# Patient Record
Sex: FEMALE | Race: WHITE | Employment: FULL TIME | URBAN - METROPOLITAN AREA
[De-identification: names, ages, dates, MRNs, and addresses within clinical notes are randomized per-mention and may not be internally consistent; named-entity substitution may affect disease eponyms.]

---

## 2019-02-08 LAB
ABO + RH BLD: NORMAL
BLD GP AB SCN SERPL QL: NEGATIVE
HBV SURFACE AG SERPL QL IA: NEGATIVE
HIV 1+2 AB+HIV1 P24 AG SERPL QL IA: NEGATIVE
RUBELLA ANTIBODY IGG QUANTITATIVE: NORMAL IU/ML
TREPONEMA ANTIBODIES: NORMAL

## 2019-08-30 LAB — GROUP B STREP PCR: NEGATIVE

## 2019-09-05 PROBLEM — O13.3 GESTATIONAL HYPERTENSION, THIRD TRIMESTER: Status: ACTIVE | Noted: 2019-09-05

## 2019-09-05 PROBLEM — Z3A.37 37 WEEKS GESTATION OF PREGNANCY: Status: ACTIVE | Noted: 2019-09-05

## 2019-09-05 NOTE — H&P
2019    Kaity Seay  [id number]            OB Admit History & Physical      Chief Complaint: Primary  Section  Gestational Hypertension    History of Present Illness:  39 year old  at 37W 0D. Estimated gestational age by LMP and known embryo transfer date with EDC 2019.    Pregnancy complicated by advanced maternal age, donor embryo conception, hypothyroidism (taking levothyroxine), gestational diabetes-diet controlled and thrombocytopenia. Her platelets in the first trimester (2019) was 153,000. On 2019 was 117,000. Last platelet count on 9/3/2019 was 110,000. She had a normal/negative Nuchal Translucency, Innatal, MSAFP and 20 week ultrasound.    She developed elevated blood pressure within the last two weeks. BP on  was 140/80 and on 9/3 was 144/76. Prior blood pressures were 110s-120s/60s-70s. Labs were drawn and there is no evidence of pre-eclampsia.    Above situation discussed with patient at length. Given gestational age and development of gestational hypertension, deliver recommended.    Patient desires an elective primary  Section as her mode of deliver. It has been reviewed with her the risks, benefits, pros and cons to a  Section vs a vaginal delivery. Questions answered. She desires to proceed.    LMP 2019   Her Estimated Date of Delivery: 2019, making her 37W 0D.      There is no height or weight on file to calculate BMI.    See prenatal for labs. GBS negative, Rubella Immune, Rh positive    Estimated fetal weight= 3300 gm       She is a 39 year old   Her OB history:   OB History   No data available          No past medical history on file.     No past surgical history on file.      No current outpatient medications on file.       Allergies: Patient has no allergy information on record.      REVIEW OF SYSTEMS:  NEUROLOGIC:  Negative  EYES:  Negative  ENT:  Negative  GI:  Negative  BREAST:  Negative  :  Negative  GYN:   "Negative  CV:  Negative  PULMONARY:  Negative  MUSCULOSKELETAL:  Negative  PSYCH:  Negative        Social History     Socioeconomic History     Marital status: Single     Spouse name: Not on file     Number of children: Not on file     Years of education: Not on file     Highest education level: Not on file   Occupational History     Not on file   Social Needs     Financial resource strain: Not on file     Food insecurity:     Worry: Not on file     Inability: Not on file     Transportation needs:     Medical: Not on file     Non-medical: Not on file   Tobacco Use     Smoking status: Not on file   Substance and Sexual Activity     Alcohol use: Not on file     Drug use: Not on file     Sexual activity: Not on file   Lifestyle     Physical activity:     Days per week: Not on file     Minutes per session: Not on file     Stress: Not on file   Relationships     Social connections:     Talks on phone: Not on file     Gets together: Not on file     Attends Islam service: Not on file     Active member of club or organization: Not on file     Attends meetings of clubs or organizations: Not on file     Relationship status: Not on file     Intimate partner violence:     Fear of current or ex partner: Not on file     Emotionally abused: Not on file     Physically abused: Not on file     Forced sexual activity: Not on file   Other Topics Concern     Not on file   Social History Narrative     Not on file      No family history on file.          Vitals: Height 5' 4.5\"  Weight 177 lbs  /76    General: Alert Awake in NAD  HEENT: grossly normal  Neck: no lymphadenopathy or thryoidomegaly  Lungs: clear  Back: no spinal or CVAT  Heart: S1 S2 RRR  Abdomen: gravid, soft  EXT:  Bilateral lower extremity edema but no calf tenderness  Neuro: CN 2-12 grossly intact    Assessment: 39 year old  at 37W 0D  Gestational Hypertension  Request for primary  Section  Gestational Thrombocytopenia (Platelets 153,000 on , " 117,000 on  and 110,000 on 9/3)  GBS negative    Plan: Primary  Section on 2019  Type and Screen and CBC on arrival    Issac Farr MD  Department of OB/GYN  2019

## 2019-09-06 ENCOUNTER — ANESTHESIA (OUTPATIENT)
Dept: OBGYN | Facility: CLINIC | Age: 39
End: 2019-09-06
Payer: COMMERCIAL

## 2019-09-06 ENCOUNTER — ANESTHESIA EVENT (OUTPATIENT)
Dept: OBGYN | Facility: CLINIC | Age: 39
End: 2019-09-06
Payer: COMMERCIAL

## 2019-09-06 ENCOUNTER — HOSPITAL ENCOUNTER (INPATIENT)
Facility: CLINIC | Age: 39
LOS: 4 days | Discharge: HOME | End: 2019-09-10
Attending: OBSTETRICS & GYNECOLOGY | Admitting: OBSTETRICS & GYNECOLOGY
Payer: COMMERCIAL

## 2019-09-06 DIAGNOSIS — Z98.891 STATUS POST PRIMARY LOW TRANSVERSE CESAREAN SECTION: ICD-10-CM

## 2019-09-06 DIAGNOSIS — O13.3 GESTATIONAL HYPERTENSION, THIRD TRIMESTER: Primary | ICD-10-CM

## 2019-09-06 LAB
ABO + RH BLD: NORMAL
ABO + RH BLD: NORMAL
BASOPHILS # BLD AUTO: 0 10E9/L (ref 0–0.2)
BASOPHILS NFR BLD AUTO: 0.2 %
BLD GP AB SCN SERPL QL: NORMAL
BLOOD BANK CMNT PATIENT-IMP: NORMAL
DIFFERENTIAL METHOD BLD: ABNORMAL
EOSINOPHIL # BLD AUTO: 0.1 10E9/L (ref 0–0.7)
EOSINOPHIL NFR BLD AUTO: 1.2 %
ERYTHROCYTE [DISTWIDTH] IN BLOOD BY AUTOMATED COUNT: 13.4 % (ref 10–15)
GLUCOSE BLDC GLUCOMTR-MCNC: 73 MG/DL (ref 70–99)
GLUCOSE SERPL-MCNC: 80 MG/DL (ref 70–99)
HCT VFR BLD AUTO: 39.6 % (ref 35–47)
HGB BLD-MCNC: 13.6 G/DL (ref 11.7–15.7)
IMM GRANULOCYTES # BLD: 0.1 10E9/L (ref 0–0.4)
IMM GRANULOCYTES NFR BLD: 0.8 %
LYMPHOCYTES # BLD AUTO: 1 10E9/L (ref 0.8–5.3)
LYMPHOCYTES NFR BLD AUTO: 10.9 %
MCH RBC QN AUTO: 28.9 PG (ref 26.5–33)
MCHC RBC AUTO-ENTMCNC: 34.3 G/DL (ref 31.5–36.5)
MCV RBC AUTO: 84 FL (ref 78–100)
MONOCYTES # BLD AUTO: 0.5 10E9/L (ref 0–1.3)
MONOCYTES NFR BLD AUTO: 5.8 %
NEUTROPHILS # BLD AUTO: 7.5 10E9/L (ref 1.6–8.3)
NEUTROPHILS NFR BLD AUTO: 81.1 %
NRBC # BLD AUTO: 0 10*3/UL
NRBC BLD AUTO-RTO: 0 /100
PLATELET # BLD AUTO: 96 10E9/L (ref 150–450)
RBC # BLD AUTO: 4.71 10E12/L (ref 3.8–5.2)
SPECIMEN EXP DATE BLD: NORMAL
T PALLIDUM AB SER QL: NONREACTIVE
WBC # BLD AUTO: 9.3 10E9/L (ref 4–11)

## 2019-09-06 PROCEDURE — 25000125 ZZHC RX 250: Performed by: OBSTETRICS & GYNECOLOGY

## 2019-09-06 PROCEDURE — 86901 BLOOD TYPING SEROLOGIC RH(D): CPT | Performed by: OBSTETRICS & GYNECOLOGY

## 2019-09-06 PROCEDURE — 25000132 ZZH RX MED GY IP 250 OP 250 PS 637: Performed by: OBSTETRICS & GYNECOLOGY

## 2019-09-06 PROCEDURE — 37000008 ZZH ANESTHESIA TECHNICAL FEE, 1ST 30 MIN: Performed by: OBSTETRICS & GYNECOLOGY

## 2019-09-06 PROCEDURE — 25800030 ZZH RX IP 258 OP 636: Performed by: OBSTETRICS & GYNECOLOGY

## 2019-09-06 PROCEDURE — 37000009 ZZH ANESTHESIA TECHNICAL FEE, EACH ADDTL 15 MIN: Performed by: OBSTETRICS & GYNECOLOGY

## 2019-09-06 PROCEDURE — 25000128 H RX IP 250 OP 636: Performed by: OBSTETRICS & GYNECOLOGY

## 2019-09-06 PROCEDURE — 27210794 ZZH OR GENERAL SUPPLY STERILE: Performed by: OBSTETRICS & GYNECOLOGY

## 2019-09-06 PROCEDURE — 86850 RBC ANTIBODY SCREEN: CPT | Performed by: OBSTETRICS & GYNECOLOGY

## 2019-09-06 PROCEDURE — 82947 ASSAY GLUCOSE BLOOD QUANT: CPT | Performed by: OBSTETRICS & GYNECOLOGY

## 2019-09-06 PROCEDURE — 36415 COLL VENOUS BLD VENIPUNCTURE: CPT | Performed by: OBSTETRICS & GYNECOLOGY

## 2019-09-06 PROCEDURE — 00000146 ZZHCL STATISTIC GLUCOSE BY METER IP

## 2019-09-06 PROCEDURE — 12000035 ZZH R&B POSTPARTUM

## 2019-09-06 PROCEDURE — 25000128 H RX IP 250 OP 636: Performed by: ANESTHESIOLOGY

## 2019-09-06 PROCEDURE — 25800030 ZZH RX IP 258 OP 636: Performed by: NURSE ANESTHETIST, CERTIFIED REGISTERED

## 2019-09-06 PROCEDURE — 71000012 ZZH RECOVERY PHASE 1 LEVEL 1 FIRST HR: Performed by: OBSTETRICS & GYNECOLOGY

## 2019-09-06 PROCEDURE — 86900 BLOOD TYPING SEROLOGIC ABO: CPT | Performed by: OBSTETRICS & GYNECOLOGY

## 2019-09-06 PROCEDURE — 86780 TREPONEMA PALLIDUM: CPT | Performed by: OBSTETRICS & GYNECOLOGY

## 2019-09-06 PROCEDURE — 25000132 ZZH RX MED GY IP 250 OP 250 PS 637

## 2019-09-06 PROCEDURE — 36000058 ZZH SURGERY LEVEL 3 EA 15 ADDTL MIN: Performed by: OBSTETRICS & GYNECOLOGY

## 2019-09-06 PROCEDURE — 71000013 ZZH RECOVERY PHASE 1 LEVEL 1 EA ADDTL HR: Performed by: OBSTETRICS & GYNECOLOGY

## 2019-09-06 PROCEDURE — 85025 COMPLETE CBC W/AUTO DIFF WBC: CPT | Performed by: OBSTETRICS & GYNECOLOGY

## 2019-09-06 PROCEDURE — 36000056 ZZH SURGERY LEVEL 3 1ST 30 MIN: Performed by: OBSTETRICS & GYNECOLOGY

## 2019-09-06 PROCEDURE — 25000125 ZZHC RX 250: Performed by: NURSE ANESTHETIST, CERTIFIED REGISTERED

## 2019-09-06 PROCEDURE — 25000128 H RX IP 250 OP 636: Performed by: NURSE ANESTHETIST, CERTIFIED REGISTERED

## 2019-09-06 RX ORDER — CITRIC ACID/SODIUM CITRATE 334-500MG
SOLUTION, ORAL ORAL
Status: COMPLETED
Start: 2019-09-06 | End: 2019-09-06

## 2019-09-06 RX ORDER — NALBUPHINE HYDROCHLORIDE 10 MG/ML
2.5-5 INJECTION, SOLUTION INTRAMUSCULAR; INTRAVENOUS; SUBCUTANEOUS EVERY 6 HOURS PRN
Status: DISCONTINUED | OUTPATIENT
Start: 2019-09-06 | End: 2019-09-07

## 2019-09-06 RX ORDER — PRENATAL VIT/IRON FUM/FOLIC AC 27MG-0.8MG
1 TABLET ORAL DAILY
COMMUNITY

## 2019-09-06 RX ORDER — LIDOCAINE 40 MG/G
CREAM TOPICAL
Status: DISCONTINUED | OUTPATIENT
Start: 2019-09-06 | End: 2019-09-07

## 2019-09-06 RX ORDER — NALOXONE HYDROCHLORIDE 0.4 MG/ML
.1-.4 INJECTION, SOLUTION INTRAMUSCULAR; INTRAVENOUS; SUBCUTANEOUS
Status: DISCONTINUED | OUTPATIENT
Start: 2019-09-06 | End: 2019-09-10 | Stop reason: HOSPADM

## 2019-09-06 RX ORDER — OXYTOCIN/0.9 % SODIUM CHLORIDE 30/500 ML
340 PLASTIC BAG, INJECTION (ML) INTRAVENOUS CONTINUOUS PRN
Status: DISCONTINUED | OUTPATIENT
Start: 2019-09-06 | End: 2019-09-10 | Stop reason: HOSPADM

## 2019-09-06 RX ORDER — CEFAZOLIN SODIUM 1 G/3ML
1 INJECTION, POWDER, FOR SOLUTION INTRAMUSCULAR; INTRAVENOUS SEE ADMIN INSTRUCTIONS
Status: DISCONTINUED | OUTPATIENT
Start: 2019-09-06 | End: 2019-09-06 | Stop reason: HOSPADM

## 2019-09-06 RX ORDER — HYDROCORTISONE 2.5 %
CREAM (GRAM) TOPICAL 3 TIMES DAILY PRN
Status: DISCONTINUED | OUTPATIENT
Start: 2019-09-06 | End: 2019-09-10 | Stop reason: HOSPADM

## 2019-09-06 RX ORDER — AMOXICILLIN 250 MG
1 CAPSULE ORAL 2 TIMES DAILY PRN
Status: DISCONTINUED | OUTPATIENT
Start: 2019-09-06 | End: 2019-09-10 | Stop reason: HOSPADM

## 2019-09-06 RX ORDER — BISACODYL 10 MG
10 SUPPOSITORY, RECTAL RECTAL DAILY PRN
Status: DISCONTINUED | OUTPATIENT
Start: 2019-09-08 | End: 2019-09-10 | Stop reason: HOSPADM

## 2019-09-06 RX ORDER — SODIUM CHLORIDE, SODIUM LACTATE, POTASSIUM CHLORIDE, CALCIUM CHLORIDE 600; 310; 30; 20 MG/100ML; MG/100ML; MG/100ML; MG/100ML
INJECTION, SOLUTION INTRAVENOUS CONTINUOUS
Status: DISCONTINUED | OUTPATIENT
Start: 2019-09-06 | End: 2019-09-06 | Stop reason: HOSPADM

## 2019-09-06 RX ORDER — CITRIC ACID/SODIUM CITRATE 334-500MG
30 SOLUTION, ORAL ORAL
Status: DISCONTINUED | OUTPATIENT
Start: 2019-09-06 | End: 2019-09-06 | Stop reason: HOSPADM

## 2019-09-06 RX ORDER — LIDOCAINE 40 MG/G
CREAM TOPICAL
Status: DISCONTINUED | OUTPATIENT
Start: 2019-09-06 | End: 2019-09-06 | Stop reason: HOSPADM

## 2019-09-06 RX ORDER — HYDROMORPHONE HYDROCHLORIDE 1 MG/ML
.3-.5 INJECTION, SOLUTION INTRAMUSCULAR; INTRAVENOUS; SUBCUTANEOUS EVERY 30 MIN PRN
Status: DISCONTINUED | OUTPATIENT
Start: 2019-09-06 | End: 2019-09-10 | Stop reason: HOSPADM

## 2019-09-06 RX ORDER — EPHEDRINE SULFATE 50 MG/ML
5 INJECTION, SOLUTION INTRAMUSCULAR; INTRAVENOUS; SUBCUTANEOUS
Status: DISCONTINUED | OUTPATIENT
Start: 2019-09-06 | End: 2019-09-07

## 2019-09-06 RX ORDER — ONDANSETRON 2 MG/ML
4 INJECTION INTRAMUSCULAR; INTRAVENOUS EVERY 6 HOURS PRN
Status: DISCONTINUED | OUTPATIENT
Start: 2019-09-06 | End: 2019-09-10 | Stop reason: HOSPADM

## 2019-09-06 RX ORDER — MORPHINE SULFATE 1 MG/ML
INJECTION, SOLUTION EPIDURAL; INTRATHECAL; INTRAVENOUS
Status: DISCONTINUED
Start: 2019-09-06 | End: 2019-09-06 | Stop reason: HOSPADM

## 2019-09-06 RX ORDER — LANOLIN 100 %
OINTMENT (GRAM) TOPICAL
Status: DISCONTINUED | OUTPATIENT
Start: 2019-09-06 | End: 2019-09-10 | Stop reason: HOSPADM

## 2019-09-06 RX ORDER — AMOXICILLIN 250 MG
2 CAPSULE ORAL 2 TIMES DAILY PRN
Status: DISCONTINUED | OUTPATIENT
Start: 2019-09-06 | End: 2019-09-10 | Stop reason: HOSPADM

## 2019-09-06 RX ORDER — ACETAMINOPHEN 325 MG/1
975 TABLET ORAL EVERY 8 HOURS
Status: COMPLETED | OUTPATIENT
Start: 2019-09-06 | End: 2019-09-09

## 2019-09-06 RX ORDER — OXYTOCIN/0.9 % SODIUM CHLORIDE 30/500 ML
100 PLASTIC BAG, INJECTION (ML) INTRAVENOUS CONTINUOUS
Status: DISCONTINUED | OUTPATIENT
Start: 2019-09-06 | End: 2019-09-10 | Stop reason: HOSPADM

## 2019-09-06 RX ORDER — NALOXONE HYDROCHLORIDE 0.4 MG/ML
.1-.4 INJECTION, SOLUTION INTRAMUSCULAR; INTRAVENOUS; SUBCUTANEOUS
Status: DISCONTINUED | OUTPATIENT
Start: 2019-09-06 | End: 2019-09-07

## 2019-09-06 RX ORDER — IBUPROFEN 600 MG/1
600 TABLET, FILM COATED ORAL EVERY 6 HOURS PRN
Status: DISCONTINUED | OUTPATIENT
Start: 2019-09-07 | End: 2019-09-10 | Stop reason: HOSPADM

## 2019-09-06 RX ORDER — OXYTOCIN/0.9 % SODIUM CHLORIDE 30/500 ML
PLASTIC BAG, INJECTION (ML) INTRAVENOUS
Status: DISCONTINUED
Start: 2019-09-06 | End: 2019-09-06 | Stop reason: HOSPADM

## 2019-09-06 RX ORDER — LEVOTHYROXINE SODIUM 75 UG/1
75 TABLET ORAL DAILY
COMMUNITY

## 2019-09-06 RX ORDER — DEXTROSE, SODIUM CHLORIDE, SODIUM LACTATE, POTASSIUM CHLORIDE, AND CALCIUM CHLORIDE 5; .6; .31; .03; .02 G/100ML; G/100ML; G/100ML; G/100ML; G/100ML
INJECTION, SOLUTION INTRAVENOUS CONTINUOUS
Status: DISCONTINUED | OUTPATIENT
Start: 2019-09-06 | End: 2019-09-10 | Stop reason: HOSPADM

## 2019-09-06 RX ORDER — MORPHINE SULFATE 1 MG/ML
100 INJECTION, SOLUTION EPIDURAL; INTRATHECAL; INTRAVENOUS ONCE
Status: COMPLETED | OUTPATIENT
Start: 2019-09-06 | End: 2019-09-06

## 2019-09-06 RX ORDER — METOCLOPRAMIDE HYDROCHLORIDE 5 MG/ML
10 INJECTION INTRAMUSCULAR; INTRAVENOUS EVERY 6 HOURS PRN
Status: DISCONTINUED | OUTPATIENT
Start: 2019-09-06 | End: 2019-09-10 | Stop reason: HOSPADM

## 2019-09-06 RX ORDER — ONDANSETRON 2 MG/ML
INJECTION INTRAMUSCULAR; INTRAVENOUS PRN
Status: DISCONTINUED | OUTPATIENT
Start: 2019-09-06 | End: 2019-09-06

## 2019-09-06 RX ORDER — SIMETHICONE 80 MG
80 TABLET,CHEWABLE ORAL 4 TIMES DAILY PRN
Status: DISCONTINUED | OUTPATIENT
Start: 2019-09-06 | End: 2019-09-10 | Stop reason: HOSPADM

## 2019-09-06 RX ORDER — ONDANSETRON 2 MG/ML
INJECTION INTRAMUSCULAR; INTRAVENOUS
Status: COMPLETED
Start: 2019-09-06 | End: 2019-09-06

## 2019-09-06 RX ORDER — ACETAMINOPHEN 325 MG/1
TABLET ORAL
Status: COMPLETED
Start: 2019-09-06 | End: 2019-09-06

## 2019-09-06 RX ORDER — LIDOCAINE 40 MG/G
CREAM TOPICAL
Status: DISCONTINUED | OUTPATIENT
Start: 2019-09-06 | End: 2019-09-10 | Stop reason: HOSPADM

## 2019-09-06 RX ORDER — CEFAZOLIN SODIUM 2 G/100ML
INJECTION, SOLUTION INTRAVENOUS
Status: DISCONTINUED
Start: 2019-09-06 | End: 2019-09-06 | Stop reason: HOSPADM

## 2019-09-06 RX ORDER — OXYTOCIN/0.9 % SODIUM CHLORIDE 30/500 ML
PLASTIC BAG, INJECTION (ML) INTRAVENOUS CONTINUOUS PRN
Status: DISCONTINUED | OUTPATIENT
Start: 2019-09-06 | End: 2019-09-06

## 2019-09-06 RX ORDER — ACETAMINOPHEN 325 MG/1
650 TABLET ORAL EVERY 4 HOURS PRN
Status: DISCONTINUED | OUTPATIENT
Start: 2019-09-09 | End: 2019-09-10 | Stop reason: HOSPADM

## 2019-09-06 RX ORDER — BUPIVACAINE HYDROCHLORIDE 5 MG/ML
INJECTION, SOLUTION EPIDURAL; INTRACAUDAL
Status: COMPLETED | OUTPATIENT
Start: 2019-09-06 | End: 2019-09-06

## 2019-09-06 RX ORDER — KETOROLAC TROMETHAMINE 30 MG/ML
30 INJECTION, SOLUTION INTRAMUSCULAR; INTRAVENOUS EVERY 6 HOURS
Status: COMPLETED | OUTPATIENT
Start: 2019-09-06 | End: 2019-09-07

## 2019-09-06 RX ORDER — OXYCODONE HYDROCHLORIDE 5 MG/1
5-10 TABLET ORAL
Status: DISCONTINUED | OUTPATIENT
Start: 2019-09-06 | End: 2019-09-10 | Stop reason: HOSPADM

## 2019-09-06 RX ORDER — BUPIVACAINE HYDROCHLORIDE 7.5 MG/ML
INJECTION, SOLUTION INTRASPINAL PRN
Status: DISCONTINUED | OUTPATIENT
Start: 2019-09-06 | End: 2019-09-06

## 2019-09-06 RX ORDER — CEFAZOLIN SODIUM 2 G/100ML
2 INJECTION, SOLUTION INTRAVENOUS
Status: COMPLETED | OUTPATIENT
Start: 2019-09-06 | End: 2019-09-06

## 2019-09-06 RX ORDER — OXYTOCIN 10 [USP'U]/ML
10 INJECTION, SOLUTION INTRAMUSCULAR; INTRAVENOUS
Status: DISCONTINUED | OUTPATIENT
Start: 2019-09-06 | End: 2019-09-10 | Stop reason: HOSPADM

## 2019-09-06 RX ORDER — KETOROLAC TROMETHAMINE 30 MG/ML
INJECTION, SOLUTION INTRAMUSCULAR; INTRAVENOUS
Status: DISCONTINUED
Start: 2019-09-06 | End: 2019-09-06 | Stop reason: HOSPADM

## 2019-09-06 RX ADMIN — BUPIVACAINE HYDROCHLORIDE IN DEXTROSE 12 MG: 7.5 INJECTION, SOLUTION SUBARACHNOID at 08:59

## 2019-09-06 RX ADMIN — ACETAMINOPHEN 975 MG: 325 TABLET ORAL at 11:41

## 2019-09-06 RX ADMIN — SENNOSIDES AND DOCUSATE SODIUM 1 TABLET: 8.6; 5 TABLET ORAL at 19:27

## 2019-09-06 RX ADMIN — PHENYLEPHRINE HYDROCHLORIDE 0.5 MCG/KG/MIN: 10 INJECTION INTRAVENOUS at 09:02

## 2019-09-06 RX ADMIN — ONDANSETRON 4 MG: 2 INJECTION INTRAMUSCULAR; INTRAVENOUS at 09:06

## 2019-09-06 RX ADMIN — MORPHINE SULFATE 0.1 MG: 1 INJECTION, SOLUTION EPIDURAL; INTRATHECAL; INTRAVENOUS at 08:59

## 2019-09-06 RX ADMIN — Medication 100 ML/HR: at 13:17

## 2019-09-06 RX ADMIN — KETOROLAC TROMETHAMINE 30 MG: 30 INJECTION, SOLUTION INTRAMUSCULAR at 11:03

## 2019-09-06 RX ADMIN — SODIUM CHLORIDE, POTASSIUM CHLORIDE, SODIUM LACTATE AND CALCIUM CHLORIDE: 600; 310; 30; 20 INJECTION, SOLUTION INTRAVENOUS at 09:35

## 2019-09-06 RX ADMIN — ACETAMINOPHEN 975 MG: 325 TABLET, FILM COATED ORAL at 11:41

## 2019-09-06 RX ADMIN — ACETAMINOPHEN 975 MG: 325 TABLET ORAL at 19:27

## 2019-09-06 RX ADMIN — SODIUM CITRATE AND CITRIC ACID MONOHYDRATE 30 ML: 500; 334 SOLUTION ORAL at 08:41

## 2019-09-06 RX ADMIN — SODIUM CHLORIDE, POTASSIUM CHLORIDE, SODIUM LACTATE AND CALCIUM CHLORIDE: 600; 310; 30; 20 INJECTION, SOLUTION INTRAVENOUS at 07:54

## 2019-09-06 RX ADMIN — BUPIVACAINE HYDROCHLORIDE 1 ML: 5 INJECTION, SOLUTION EPIDURAL; INTRACAUDAL at 09:03

## 2019-09-06 RX ADMIN — CEFAZOLIN SODIUM 2 G: 2 INJECTION, SOLUTION INTRAVENOUS at 08:54

## 2019-09-06 RX ADMIN — SODIUM CHLORIDE, POTASSIUM CHLORIDE, SODIUM LACTATE AND CALCIUM CHLORIDE 1000 ML: 600; 310; 30; 20 INJECTION, SOLUTION INTRAVENOUS at 07:53

## 2019-09-06 RX ADMIN — KETOROLAC TROMETHAMINE 30 MG: 30 INJECTION, SOLUTION INTRAMUSCULAR at 18:00

## 2019-09-06 RX ADMIN — Medication 340 ML/HR: at 09:23

## 2019-09-06 RX ADMIN — SODIUM CHLORIDE, SODIUM LACTATE, POTASSIUM CHLORIDE, CALCIUM CHLORIDE AND DEXTROSE MONOHYDRATE: 5; 600; 310; 30; 20 INJECTION, SOLUTION INTRAVENOUS at 18:00

## 2019-09-06 ASSESSMENT — LIFESTYLE VARIABLES: TOBACCO_USE: 0

## 2019-09-06 ASSESSMENT — MIFFLIN-ST. JEOR: SCORE: 1443.79

## 2019-09-06 NOTE — PROCEDURES
OPERATIVE REPORT     DATE OF SURGERY: 2019     PREOPERATIVE DIAGNOSIS: 27 year old  at 37W 0D  Gestational Hypertension  Desires Primary  Section     POSTOPERATIVE DIAGNOSIS:39 year old  at 37W 0D  Gestational Hypertension   Desires Primary  Section  Delivered     PROCEDURE:  Primary low transverse  section via Pfannenstiel skin incision.      SURGEONS:  Issac Farr MD     ANESTHESIA:  Spinal     QBL: 145 mL     OBSERVATIONS:     Single viable male infant in cephalic presentation.   Clear amniotic fluid.  Apgars 6, 9 & 10  Weight 6 lbs 9 oz  Normal appearing uterus, tubes and ovaries.      COMPLICATIONS:  None.      DISPOSITION:  Patient stable to recovery room.      INDICATIONS: 39 year old  at 37W 0D. Estimated gestational age by LMP and known embryo transfer date with EDC 2019.     Pregnancy complicated by advanced maternal age, donor embryo conception, hypothyroidism (taking levothyroxine), gestational diabetes-diet controlled and thrombocytopenia. Her platelets in the first trimester (2019) was 153,000. On 2019 was 117,000. Last platelet count on 9/3/2019 was 110,000. She had a normal/negative Nuchal Translucency, Innatal, MSAFP and 20 week ultrasound.     She developed elevated blood pressure within the last two weeks. BP on  was 140/80 and on 9/3 was 144/76. Prior blood pressures were 110s-120s/60s-70s. Labs were drawn and there is no evidence of pre-eclampsia.     Above situation discussed with patient at length. Given gestational age and development of gestational hypertension, deliver recommended.     Patient desires an elective primary  Section as her mode of deliver. It has been reviewed with her the risks, benefits, pros and cons to a  Section vs a vaginal delivery.The nature of a  Section was discussed with her including the potential risks of surgery (bleeding, infection and injury to other organs).   Questions answered. She desires to proceed.          PROCEDURE:  The patient was taken to the operating room where she prepped and draped in normal sterile fashion in the dorsal supine position with leftward tilt. Using a scalpel, a Pfannenstiel skin incision was made.  This incision was carried down to the underlying layer of fascia.  The fascia was then nicked in the midline and this incision was extended laterally.  The superior aspect of the fascial incision was then grasped with two Kocher clamps, elevated, and rectus muscles dissected off bluntly as well as sharply with cautery.  In a similar fashion, the inferior aspect of the fascial incision was grasped with two Kocher clamps, elevated, and the rectus muscles dissected up bluntly as well as sharply with cautery. The rectus muscles were then  in the midline, and the peritoneum identified and entered bluntly.  This opening was then extended superiorly and inferiorly with excellent visualization of the bladder.  The bladder blade was then inserted, and the vesicouterine peritoneum identified, grasped with pickups and entered sharply with Metzenbaum scissors.  This incision was extended laterally, and a bladder flap created digitally.  The bladder blade was then repositioned.      Using a scalpel, a low transverse uterine incision was made.  This incision was extended laterally via finger fractionation.  The infant was delivered atraumatically. Nose and mouth were bulb suctioned. The cord was clamped and cut.  The infant was handed off to the waiting nurses.  The placenta was then removed manually, and the uterus exteriorized and cleared of all clots and debris.  The uterine incision was closed in two layers with 1-0 chromic gut. The bladder flap was closed with 3-0 chromic in a running fashion.  Excellent hemostasis was noted.  The uterus was then returned to the abdomen and the gutters irrigated and cleared of all clots and debris.  One final look at  the incision revealed excellent hemostasis.      The peritoneum was grasped with three Ana Cristina clamps, and closed with 2-0 Vicryl in a running fashion.  The subfascial layers and rectus muscles were then examined.  Excellent hemostasis was noted.  The fascia was tagged in the midline with a single interrupted stitch of 0 Vicryl, and then reapproximated with a running stitch from one apex to the midline, and then the other apex to the midline with 0 Vicryl.  The subq was visualized to have excellent hemostasis.  The subq was reapproximated with a running stitch of 3-0 plain gut.  The skin was closed with 4-0 Monocryl in subcuticular fashion.      The patient tolerated the procedure well.  Sponge, lap and needle counts were reported to me as correct x2.  The patient received 2 grams of Ancef after the start of the case for antibiotic prophylaxis.         GABRIEL BRADSHAW MD

## 2019-09-06 NOTE — ANESTHESIA PREPROCEDURE EVALUATION
Procedure: Procedure(s):  PRIMARY  SECTION  Preop diagnosis: ELECTIVE PRIMARY    No Known Allergies  Past Medical History:   Diagnosis Date     Diabetes (H)     GDDC     Hypertension     Gest. HTN at 36 weeks     Thrombocytopenia (H)     Gestational     Thyroid disease     hypothyroid     History reviewed. No pertinent surgical history.  Social History     Tobacco Use     Smoking status: Never Smoker     Smokeless tobacco: Never Used   Substance Use Topics     Alcohol use: Not Currently     Prior to Admission medications    Medication Sig Start Date End Date Taking? Authorizing Provider   levothyroxine (SYNTHROID/LEVOTHROID) 75 MCG tablet Take 75 mcg by mouth daily   Yes Reported, Patient   Prenatal Vit-Fe Fumarate-FA (PRENATAL MULTIVITAMIN W/IRON) 27-0.8 MG tablet Take 1 tablet by mouth daily   Yes Reported, Patient     Current Facility-Administered Medications Ordered in Epic   Medication Dose Route Frequency Last Rate Last Dose     ceFAZolin (ANCEF) 1 g vial to attach to  ml bag for ADULT or 50 ml bag for PEDS  1 g Intravenous See Admin Instructions         ceFAZolin (ANCEF) intermittent infusion 2 g in 100 mL dextrose PRE-MIX  2 g Intravenous Pre-Op/Pre-procedure x 1 dose         ceFAZolin-dextrose (ANCEF) 2-4 GM/100ML-% infusion             ePHEDrine injection 5 mg  5 mg Intravenous Q3 Min PRN         lactated ringers BOLUS 250 mL  250 mL Intravenous Once PRN         lactated ringers infusion   Intravenous Continuous 125 mL/hr at 19 0754       lidocaine (LMX4) cream   Topical Q1H PRN         lidocaine (LMX4) cream   Topical Q1H PRN         lidocaine 1 % 0.1-1 mL  0.1-1 mL Other Q1H PRN         lidocaine 1 % 1 mL  1 mL Other Q1H PRN         medication instruction   Does not apply Continuous PRN         morphine (PF) (ASTRAMORPH /DURAMORPH) injection 100 mcg  100 mcg Intrathecal Once         nalbuphine (NUBAIN) injection 2.5-5 mg  2.5-5 mg Intravenous Q6H PRN         naloxone (NARCAN)  injection 0.1-0.4 mg  0.1-0.4 mg Intravenous Q2 Min PRN         Opioid plan postpartum - medication instruction   Does not apply Continuous PRN         sodium chloride (PF) 0.9% PF flush 3 mL  3 mL Intravenous Q1H PRN         sodium chloride (PF) 0.9% PF flush 3 mL  3 mL Intravenous Q8H         sodium chloride (PF) 0.9% PF flush 3 mL  3 mL Intracatheter q1 min prn         sodium chloride (PF) 0.9% PF flush 3 mL  3 mL Intracatheter Q8H         sodium citrate-citric acid (BICITRA) 500-334 MG/5ML solution             sodium citrate-citric acid (BICITRA) solution 30 mL  30 mL Oral Pre-Op/Pre-procedure x 1 dose         No current [facility]-ordered outpatient medications on file.       lactated ringers 125 mL/hr at 19 0754     - MEDICATION INSTRUCTIONS -       - MEDICATION INSTRUCTIONS -       Wt Readings from Last 1 Encounters:   19 79.4 kg (175 lb)     Temp Readings from Last 1 Encounters:   19 36.3  C (97.4  F) (Temporal)     BP Readings from Last 6 Encounters:   19 134/87     Pulse Readings from Last 4 Encounters:   19 78     Resp Readings from Last 1 Encounters:   19 16     SpO2 Readings from Last 1 Encounters:   No data found for SpO2     Recent Labs   Lab Test 19  0724   GLC 80     No results for input(s): AST, ALT, ALKPHOS, BILITOTAL, LIPASE in the last 34603 hours.  Recent Labs   Lab Test 19  0724   WBC 9.3   HGB 13.6   PLT 96*     Recent Labs   Lab Test 19  0724   ABO O   RH Pos     No results for input(s): INR, PTT in the last 75629 hours.   No results for input(s): TROPI in the last 29130 hours.  No results for input(s): PH, PCO2, PO2, HCO3 in the last 85110 hours.  No results for input(s): HCG in the last 45952 hours.  No results found for this or any previous visit (from the past 744 hour(s)).    RECENT LABS:   ECG:   ECHO:   Anesthesia Pre-Procedure Evaluation    Patient: Kaity Seay   MRN: [id number] : 1980          Preoperative Diagnosis: ELECTIVE  "PRIMARY    Procedure(s):  PRIMARY  SECTION    Past Medical History:   Diagnosis Date     Diabetes (H)     GDDC     Hypertension     Gest. HTN at 36 weeks     Thrombocytopenia (H)     Gestational     Thyroid disease     hypothyroid     History reviewed. No pertinent surgical history.    Anesthesia Evaluation     . Pt has not had prior anesthetic            ROS/MED HX    ENT/Pulmonary:      (-) tobacco use   Neurologic:       Cardiovascular:     (+) hypertension----. : . . . :. .       METS/Exercise Tolerance:     Hematologic: Comments: Plts 96k       (-) anemia   Musculoskeletal:         GI/Hepatic:         Renal/Genitourinary:         Endo: Comment: Gest diabetic    (+) thyroid problem hypothyroidism, .      Psychiatric:         Infectious Disease:         Malignancy:         Other:                          Physical Exam  Normal systems: cardiovascular, pulmonary and dental    Airway   Mallampati: I  Neck ROM: full    Dental     Cardiovascular       Pulmonary             Lab Results   Component Value Date    WBC 9.3 2019    HGB 13.6 2019    HCT 39.6 2019    PLT 96 (L) 2019    GLC 80 2019       Preop Vitals  BP Readings from Last 3 Encounters:   19 134/87    Pulse Readings from Last 3 Encounters:   19 78      Resp Readings from Last 3 Encounters:   19 16    SpO2 Readings from Last 3 Encounters:   No data found for SpO2      Temp Readings from Last 1 Encounters:   19 36.3  C (97.4  F) (Temporal)    Ht Readings from Last 1 Encounters:   19 1.626 m (5' 4\")      Wt Readings from Last 1 Encounters:   19 79.4 kg (175 lb)    Estimated body mass index is 30.04 kg/m  as calculated from the following:    Height as of this encounter: 1.626 m (5' 4\").    Weight as of this encounter: 79.4 kg (175 lb).       Anesthesia Plan      History & Physical Review  History and physical reviewed and following examination; no interval change.    ASA Status:  2 .    NPO " Status:  > 8 hours    Plan for Spinal   PONV prophylaxis:  Ondansetron (or other 5HT-3)       Postoperative Care  Postoperative pain management:  IV analgesics.      Consents  Anesthetic plan, risks, benefits and alternatives discussed with:  Patient and Spouse..                 Amari Meade MD

## 2019-09-06 NOTE — ANESTHESIA CARE TRANSFER NOTE
Patient: Kaity Seay    Procedure(s):  PRIMARY  SECTION    Diagnosis: ELECTIVE PRIMARY  Diagnosis Additional Information: No value filed.    Anesthesia Type:   Spinal     Note:  Airway :Room Air  Patient transferred to:PACU  Comments: Transferred to OB PACU recovery, spontaneous respirations on room air with oxygen saturations maintained greater than 98%. SpO2, NiBP, and EKG monitors and alarms on and functioning, report on patient's clinical status given to OB recovery RN, RN questions answered, patient in hospital bed with siderails up, Jose Hugger warmer connected to patient gown, Oxygen tubing connected to wall O2 in OB PACU Oxytocin 30 units in 500mL infusion connected to IV infusion pump in recovery bay and programmed to 100 mL/hr at handoff of care.Handoff Report: Identifed the Patient, Identified the Reponsible Provider, Reviewed the pertinent medical history, Discussed the surgical course, Reviewed Intra-OP anesthesia mangement and issues during anesthesia, Set expectations for post-procedure period and Allowed opportunity for questions and acknowledgement of understanding      Vitals: (Last set prior to Anesthesia Care Transfer)    CRNA VITALS  2019 0935 - 2019 1006      2019             Resp Rate (set):  10                Electronically Signed By: SAHARA Gipson CRNA  2019  10:06 AM

## 2019-09-06 NOTE — PLAN OF CARE
At 1205 Pt. admitted from L&D via bed.. Pt. arrived with baby and was accompanied by &RN and arrived with personal belongings. Report was taken from Melanie rene RN in L&D.  Pt. is A&Ox3 and VSS on RA 96 to 98%. Fundus is firm and midline.  Vaginal bleeding is scant.   Pt. c/o 0/10 pain. Pt. denied any nausea, CP, SOB, lightheadedness, or dizziness. PIV patent and infusing.  Wan patent and draining.  Dressing to lower abdomen CDI.  Pneumoboots in place to BLE.  Pt. oriented to the room and call light system.Patient plan is to pump&bottle feed baby,started pumping every 3 hours,bottle feeding baby with donor milk for now.Will continue to monitor.

## 2019-09-06 NOTE — ANESTHESIA POSTPROCEDURE EVALUATION
Patient: Kaity Seay    Procedure(s):  PRIMARY  SECTION    Diagnosis:ELECTIVE PRIMARY  Diagnosis Additional Information: No value filed.    Anesthesia Type:  Spinal    Note:  Anesthesia Post Evaluation    Patient location during evaluation: PACU  Patient participation: Able to fully participate in evaluation  Level of consciousness: awake and alert  Pain management: adequate  Airway patency: patent  Cardiovascular status: acceptable  Respiratory status: acceptable  Hydration status: acceptable  PONV: none     Anesthetic complications: None          Last vitals:  Vitals:    19 1451 19 1636 19 1645   BP: (!) 148/81 138/82 (!) 151/82   Pulse:      Resp: 16 16 16   Temp: 36.8  C (98.2  F)  36.6  C (97.9  F)   SpO2: 98% 99% 98%         Electronically Signed By: Amari Meade MD  2019  4:50 PM

## 2019-09-06 NOTE — PLAN OF CARE
Data: Kaity Seay transferred to 404 via bed at 1200. Baby transferred via parent's arms.  Action: Receiving unit notified of transfer: Yes. Patient and family notified of room change. Report given on mom and baby to RASHAD Shah at 1205. Belongings sent to receiving unit. Accompanied by Registered Nurse. Oriented patient to surroundings. Call light within reach. ID bands double-checked with receiving RN.  Response: Patient tolerated transfer and is stable.

## 2019-09-06 NOTE — ANESTHESIA PROCEDURE NOTES
Peripheral nerve/Neuraxial procedure note : intrathecal  Pre-Procedure  Performed by Amari Meade MD  Location: OR, OB      Pre-Anesthestic Checklist: patient identified, IV checked, site marked, risks and benefits discussed, informed consent, monitors and equipment checked, pre-op evaluation, at physician/surgeon's request and post-op pain management    Timeout  Correct Patient: Yes   Correct Procedure: Yes   Correct Site: Yes   Correct Laterality: Yes   Correct Position: Yes   Site Marked: Yes   .   Procedure Documentation  ASA 2  .    Procedure:    Intrathecal.  Insertion Site:L2-3  (midline approach)      Patient Prep;mask, sterile gloves, povidone-iodine 7.5% surgical scrub, patient draped.  .  Needle: Quincke Spinal Needle (gauge): 25  Spinal/LP Needle Length (inches): 3 Introducer used .       Assessment/Narrative  Paresthesias: No.  .  .  clear CSF fluid removed .   Medications Administered  Bupivacaine (MARCAINE) preservative free injection 0.5%, 1 mL

## 2019-09-06 NOTE — PLAN OF CARE
Received patient from home for a scheduled  section.   at 37 weeks gestation.  Admission database completed.  Monitors placed.  Spouse at bedside.

## 2019-09-07 LAB — HGB BLD-MCNC: 11.7 G/DL (ref 11.7–15.7)

## 2019-09-07 PROCEDURE — 36415 COLL VENOUS BLD VENIPUNCTURE: CPT | Performed by: OBSTETRICS & GYNECOLOGY

## 2019-09-07 PROCEDURE — 25000128 H RX IP 250 OP 636: Performed by: OBSTETRICS & GYNECOLOGY

## 2019-09-07 PROCEDURE — 12000035 ZZH R&B POSTPARTUM

## 2019-09-07 PROCEDURE — 85018 HEMOGLOBIN: CPT | Performed by: OBSTETRICS & GYNECOLOGY

## 2019-09-07 PROCEDURE — 25000132 ZZH RX MED GY IP 250 OP 250 PS 637: Performed by: OBSTETRICS & GYNECOLOGY

## 2019-09-07 RX ORDER — LEVOTHYROXINE SODIUM 75 UG/1
75 TABLET ORAL
Status: DISCONTINUED | OUTPATIENT
Start: 2019-09-07 | End: 2019-09-10 | Stop reason: HOSPADM

## 2019-09-07 RX ADMIN — IBUPROFEN 600 MG: 600 TABLET ORAL at 23:40

## 2019-09-07 RX ADMIN — OXYCODONE HYDROCHLORIDE 5 MG: 5 TABLET ORAL at 20:48

## 2019-09-07 RX ADMIN — SENNOSIDES AND DOCUSATE SODIUM 1 TABLET: 8.6; 5 TABLET ORAL at 20:06

## 2019-09-07 RX ADMIN — OXYCODONE HYDROCHLORIDE 5 MG: 5 TABLET ORAL at 15:51

## 2019-09-07 RX ADMIN — OXYCODONE HYDROCHLORIDE 5 MG: 5 TABLET ORAL at 12:05

## 2019-09-07 RX ADMIN — KETOROLAC TROMETHAMINE 30 MG: 30 INJECTION, SOLUTION INTRAMUSCULAR at 06:11

## 2019-09-07 RX ADMIN — KETOROLAC TROMETHAMINE 30 MG: 30 INJECTION, SOLUTION INTRAMUSCULAR at 00:29

## 2019-09-07 RX ADMIN — IBUPROFEN 600 MG: 600 TABLET ORAL at 18:02

## 2019-09-07 RX ADMIN — OXYCODONE HYDROCHLORIDE 5 MG: 5 TABLET ORAL at 08:30

## 2019-09-07 RX ADMIN — SENNOSIDES AND DOCUSATE SODIUM 1 TABLET: 8.6; 5 TABLET ORAL at 08:30

## 2019-09-07 RX ADMIN — OXYCODONE HYDROCHLORIDE 5 MG: 5 TABLET ORAL at 23:40

## 2019-09-07 RX ADMIN — ACETAMINOPHEN 975 MG: 325 TABLET ORAL at 20:06

## 2019-09-07 RX ADMIN — ACETAMINOPHEN 975 MG: 325 TABLET ORAL at 12:05

## 2019-09-07 RX ADMIN — ACETAMINOPHEN 975 MG: 325 TABLET ORAL at 03:55

## 2019-09-07 RX ADMIN — IBUPROFEN 600 MG: 600 TABLET ORAL at 12:05

## 2019-09-07 NOTE — PLAN OF CARE
Vital signs stable,voiding with out difficulty,pain control with tylenol,ibuprofen&oxycodone,pumping every 3 hours.Will continue to monitor.

## 2019-09-07 NOTE — PLAN OF CARE
VSS. O2 diping while sleeping, 2L given to pt via nasal canula.  FF, scant flow. Incision dressing marked, no extension of drainage. IV removed. Wan removed with good output. Up to the bathroom this morning. Able to void. Taking tylenol and toradol for pain, denying the need for oxy. Tolerating food. Encouraged to drink fluids. Pumping. Planning to pump and bottle infant at home. Able to ambulate without dizziness. Will continue to monitor.

## 2019-09-07 NOTE — LACTATION NOTE
Initial visit with Kaity, RAHUL and baby. Questions answered regarding pumping and physiology of milk supply and production.  Hot packs given.  Advised to pump 8-12x/day.  Explained benefits of holding and skin to skin.  Encouraged lots of skin to skin. Instructed on hand expression. Spoons given.    Continues to nurse well per mom. No further questions at this time.   Will follow as needed.   Nancy FRIEDMANN, RN, PHN, RNC-MNN, IBCLC

## 2019-09-07 NOTE — PROGRESS NOTES
[facility]   Obstetrics Post-Op / Progress Note         Assessment and Plan:    Assessment:   Post-operative day #1  Low transverse primary  section  L&D complications: ELECTIVE PRIMARY      Doing well.  Normal healing wound.  No immediate surgical complications identified.  Pain well-controlled.      Plan:   Ambulation encouraged  Breast feeding strategies discussed  Pain control measures as needed  Anticipate discharge in 2 days            Interval History:   Doing well.  Pain is well-controlled.  No fevers.  No history of wound drainage, warmth or significant erythema.  Good appetite.  Denies chest pain, shortness of breath, nausea or vomiting.  Ambulatory.  Breastfeeding well.          Significant Problems:    None          Review of Systems:    The patient denies any chest pain, shortness of breath, excessive pain, fever, chills, purulent drainage from the wound, nausea or vomiting.          Medications:     All medications related to the patient's surgery have been reviewed  Current Facility-Administered Medications   Medication     [START ON 2019] acetaminophen (TYLENOL) tablet 650 mg     acetaminophen (TYLENOL) tablet 975 mg     [START ON 2019] bisacodyl (DULCOLAX) Suppository 10 mg     dextrose 5% in lactated ringers infusion     hydrocortisone 2.5 % cream     HYDROmorphone (PF) (DILAUDID) injection 0.3-0.5 mg     ibuprofen (ADVIL/MOTRIN) tablet 600 mg     lactated ringers BOLUS 1,000 mL     lanolin ointment     levothyroxine (SYNTHROID/LEVOTHROID) tablet 75 mcg     lidocaine (LMX4) cream     lidocaine 1 % 0.1-1 mL     magnesium hydroxide (MILK OF MAGNESIA) suspension 30 mL     metoclopramide (REGLAN) injection 10 mg     naloxone (NARCAN) injection 0.1-0.4 mg     No MMR Needed -  Assessment: Patient does not need MMR vaccine     NO Rho (D) immune globulin (RhoGam) needed - mother Rh POSITIVE     No Tdap Needed - Assessment: Patient does not need Tdap vaccine     ondansetron  (ZOFRAN) injection 4 mg     oxyCODONE (ROXICODONE) tablet 5-10 mg     oxytocin (PITOCIN) 30 units in 500 mL 0.9% NaCl infusion     oxytocin (PITOCIN) 30 units in 500 mL 0.9% NaCl infusion     oxytocin (PITOCIN) injection 10 Units     senna-docusate (SENOKOT-S/PERICOLACE) 8.6-50 MG per tablet 1 tablet    Or     senna-docusate (SENOKOT-S/PERICOLACE) 8.6-50 MG per tablet 2 tablet     simethicone (MYLICON) chewable tablet 80 mg     sodium chloride (PF) 0.9% PF flush 3 mL     sodium chloride (PF) 0.9% PF flush 3 mL     [START ON 9/8/2019] sodium phosphate (FLEET ENEMA) 1 enema     tranexamic acid (CYKLOKAPRON) Bolus 1g vial attach to NaCl 50 or 100 mL bag ADULT             Physical Exam:   All vitals stable  Wound clean and dry with minimal or no drainage.  Surrounding skin with minimal erythema.          Data:     All laboratory data related to this surgery reviewed  Hemoglobin   Date Value Ref Range Status   09/06/2019 13.6 11.7 - 15.7 g/dL Final     No imaging studies have been ordered    Karen Luz MD, MD

## 2019-09-08 PROCEDURE — 12000035 ZZH R&B POSTPARTUM

## 2019-09-08 PROCEDURE — 25000132 ZZH RX MED GY IP 250 OP 250 PS 637: Performed by: OBSTETRICS & GYNECOLOGY

## 2019-09-08 RX ADMIN — IBUPROFEN 600 MG: 600 TABLET ORAL at 18:53

## 2019-09-08 RX ADMIN — ACETAMINOPHEN 975 MG: 325 TABLET ORAL at 04:01

## 2019-09-08 RX ADMIN — OXYCODONE HYDROCHLORIDE 5 MG: 5 TABLET ORAL at 15:19

## 2019-09-08 RX ADMIN — IBUPROFEN 600 MG: 600 TABLET ORAL at 06:42

## 2019-09-08 RX ADMIN — SENNOSIDES AND DOCUSATE SODIUM 1 TABLET: 8.6; 5 TABLET ORAL at 19:57

## 2019-09-08 RX ADMIN — ACETAMINOPHEN 975 MG: 325 TABLET ORAL at 11:33

## 2019-09-08 RX ADMIN — OXYCODONE HYDROCHLORIDE 5 MG: 5 TABLET ORAL at 08:31

## 2019-09-08 RX ADMIN — OXYCODONE HYDROCHLORIDE 5 MG: 5 TABLET ORAL at 11:34

## 2019-09-08 RX ADMIN — SENNOSIDES AND DOCUSATE SODIUM 2 TABLET: 8.6; 5 TABLET ORAL at 08:31

## 2019-09-08 RX ADMIN — OXYCODONE HYDROCHLORIDE 5 MG: 5 TABLET ORAL at 18:53

## 2019-09-08 RX ADMIN — LEVOTHYROXINE SODIUM 75 MCG: 75 TABLET ORAL at 06:43

## 2019-09-08 RX ADMIN — ACETAMINOPHEN 975 MG: 325 TABLET ORAL at 19:57

## 2019-09-08 RX ADMIN — IBUPROFEN 600 MG: 600 TABLET ORAL at 13:08

## 2019-09-08 RX ADMIN — OXYCODONE HYDROCHLORIDE 5 MG: 5 TABLET ORAL at 22:35

## 2019-09-08 NOTE — PLAN OF CARE
Mildly elevated blood pressures,143/91,142/89  aware,he wants to be notify if BP greater than 150/90,denies any headache,vision change or epigastric pain,+2 pedal edema bilaterally,voiding with out difficulty,pain control with tylenol,ibuprofen&oxycodone,incision intact with steri strips,pumping every 3 hours getting drops of colostrum,her plan is to pump&bottle feed baby.Bottle feeding donor milk while here.Will continue to monitor.

## 2019-09-08 NOTE — PLAN OF CARE
Vital signs stable. Fundus firm at U/U scant lochia. Incision open to air.Voiding without difficulty. Lung sounds clear and equal. Taking tylenol,oxycodone/ibuprofen for pain management. Up ambulating free of dizziness. Pumping every 3 hours. Encouraged to call with questions/concerns. Will continue to monitor.

## 2019-09-08 NOTE — PROGRESS NOTES
POD #2    No unusual complaints. Passing gas.  No bowel movement.  Was not on bp meds prior to delivery, developed elevated bp last week.    bp 143/91, 139/82, 135/80, 133/81, 136/89, 149/9      97.5      Abdomen soft.  Incision healing well    Stable POD #2  Hx gestational hypertension late in pg, now labile bp    Will watch bp for now.  Did discuss potential bp medication.    Plans home tomorrow.

## 2019-09-09 LAB
ALT SERPL W P-5'-P-CCNC: 36 U/L (ref 0–50)
AST SERPL W P-5'-P-CCNC: 34 U/L (ref 0–45)
PLATELET # BLD AUTO: 116 10E9/L (ref 150–450)

## 2019-09-09 PROCEDURE — 12000035 ZZH R&B POSTPARTUM

## 2019-09-09 PROCEDURE — 84450 TRANSFERASE (AST) (SGOT): CPT | Performed by: OBSTETRICS & GYNECOLOGY

## 2019-09-09 PROCEDURE — 84460 ALANINE AMINO (ALT) (SGPT): CPT | Performed by: OBSTETRICS & GYNECOLOGY

## 2019-09-09 PROCEDURE — 25000132 ZZH RX MED GY IP 250 OP 250 PS 637: Performed by: OBSTETRICS & GYNECOLOGY

## 2019-09-09 PROCEDURE — 36415 COLL VENOUS BLD VENIPUNCTURE: CPT | Performed by: OBSTETRICS & GYNECOLOGY

## 2019-09-09 PROCEDURE — 85049 AUTOMATED PLATELET COUNT: CPT | Performed by: OBSTETRICS & GYNECOLOGY

## 2019-09-09 RX ORDER — LABETALOL 100 MG/1
200 TABLET, FILM COATED ORAL EVERY 12 HOURS SCHEDULED
Status: DISCONTINUED | OUTPATIENT
Start: 2019-09-09 | End: 2019-09-10 | Stop reason: HOSPADM

## 2019-09-09 RX ADMIN — MAGNESIUM HYDROXIDE 30 ML: 400 SUSPENSION ORAL at 20:10

## 2019-09-09 RX ADMIN — OXYCODONE HYDROCHLORIDE 5 MG: 5 TABLET ORAL at 00:31

## 2019-09-09 RX ADMIN — OXYCODONE HYDROCHLORIDE 5 MG: 5 TABLET ORAL at 11:43

## 2019-09-09 RX ADMIN — ACETAMINOPHEN 650 MG: 325 TABLET, FILM COATED ORAL at 18:10

## 2019-09-09 RX ADMIN — LABETALOL HYDROCHLORIDE 200 MG: 100 TABLET, FILM COATED ORAL at 20:02

## 2019-09-09 RX ADMIN — OXYCODONE HYDROCHLORIDE 5 MG: 5 TABLET ORAL at 06:44

## 2019-09-09 RX ADMIN — OXYCODONE HYDROCHLORIDE 5 MG: 5 TABLET ORAL at 22:12

## 2019-09-09 RX ADMIN — OXYCODONE HYDROCHLORIDE 5 MG: 5 TABLET ORAL at 18:10

## 2019-09-09 RX ADMIN — IBUPROFEN 600 MG: 600 TABLET ORAL at 06:44

## 2019-09-09 RX ADMIN — IBUPROFEN 600 MG: 600 TABLET ORAL at 00:31

## 2019-09-09 RX ADMIN — SENNOSIDES AND DOCUSATE SODIUM 1 TABLET: 8.6; 5 TABLET ORAL at 08:44

## 2019-09-09 RX ADMIN — ACETAMINOPHEN 975 MG: 325 TABLET ORAL at 04:07

## 2019-09-09 RX ADMIN — SENNOSIDES AND DOCUSATE SODIUM 2 TABLET: 8.6; 5 TABLET ORAL at 20:04

## 2019-09-09 RX ADMIN — IBUPROFEN 600 MG: 600 TABLET ORAL at 20:02

## 2019-09-09 RX ADMIN — LABETALOL HYDROCHLORIDE 200 MG: 100 TABLET, FILM COATED ORAL at 09:48

## 2019-09-09 RX ADMIN — IBUPROFEN 600 MG: 600 TABLET ORAL at 14:02

## 2019-09-09 RX ADMIN — OXYCODONE HYDROCHLORIDE 5 MG: 5 TABLET ORAL at 15:05

## 2019-09-09 RX ADMIN — LEVOTHYROXINE SODIUM 75 MCG: 75 TABLET ORAL at 06:44

## 2019-09-09 NOTE — LACTATION NOTE
Routine and discharge visit. Mother states breast feeding is going well.  Mother is also pumping.  States she was starting to get more colostrum yesterday and her breasts are starting to feel full.  Discussed milk coming in and importance of continued stimulation.  No further questions at this time. Reviewed follow up with outpatient lactation consultant in pediatrician clinic.    Georgia Chauhan RN, IBCLC

## 2019-09-09 NOTE — PROGRESS NOTES
POD 3    Voiding freely  +flatus  No HA    BPs mostly ~140/~90    abd appropriately tender  Incision dry / intact  Ext nt    Rh positive  Rubella immune    POD 1 hgb = 11.7    A/p    POD 3  Primary CS  37 weeks  Gestational hypertension    Note pt without history of prepregnancy hypertension    Stable, though BPs elevated    Disc importance of BP control; will start labetalol 200 BID    Disc potential need for anti-HTN rx for a few weeks post-delivery    Will check PLT / AST / ALT (eg r/o postpartum preeclampsia)    Will plan discharge in the upcoming days when BP improved somewhat    Yoel GARAY

## 2019-09-09 NOTE — PROVIDER NOTIFICATION
Called labs results(ALT,AST,plts.) to Dr. Diallo. Labs WNL, pt. started on Labetalol 200 mg BID today. MD would like BP's every 3 hrs. while pt. awake. No discharge today.

## 2019-09-09 NOTE — PROGRESS NOTES
POD 3 update    Pt informed  Labs stable     AST 34  ALT 36  PLT 116K    She received first dose of labetalol 200mg  (plan BID)    Yoel GARYA

## 2019-09-09 NOTE — PLAN OF CARE
Blood pressures overnight 148/89 , 140/89 and 137/90. Denies headache and blurry vision. Fundus firm, scant lochia. Incision dressing open to air, adhesive strip intact and dry.Taking oxycodone, tylenol and ibuprofen for pain management. Pumping every 3 hours.

## 2019-09-09 NOTE — PLAN OF CARE
/89 this am, started Labetalol 200 mg BID today. BP's 130/80's this afternoon. Denies HA, epigastric pain, vision changes. ALT, AST, plts. drawn this am and called to Dr. Diallo. Pumping and bottle feeding donor milk, getting drops of colostrum. Plan to use formula at home.

## 2019-09-10 VITALS
OXYGEN SATURATION: 96 % | HEIGHT: 64 IN | BODY MASS INDEX: 29.88 KG/M2 | WEIGHT: 175 LBS | DIASTOLIC BLOOD PRESSURE: 90 MMHG | TEMPERATURE: 97.9 F | SYSTOLIC BLOOD PRESSURE: 143 MMHG | RESPIRATION RATE: 16 BRPM | HEART RATE: 82 BPM

## 2019-09-10 PROCEDURE — 25000132 ZZH RX MED GY IP 250 OP 250 PS 637: Performed by: OBSTETRICS & GYNECOLOGY

## 2019-09-10 RX ORDER — LANOLIN 100 %
OINTMENT (GRAM) TOPICAL
COMMUNITY
Start: 2019-09-10

## 2019-09-10 RX ORDER — LABETALOL 100 MG/1
200 TABLET, FILM COATED ORAL ONCE
Status: COMPLETED | OUTPATIENT
Start: 2019-09-10 | End: 2019-09-10

## 2019-09-10 RX ORDER — IBUPROFEN 600 MG/1
600 TABLET, FILM COATED ORAL EVERY 6 HOURS PRN
COMMUNITY
Start: 2019-09-10

## 2019-09-10 RX ORDER — LABETALOL 200 MG/1
200 TABLET, FILM COATED ORAL EVERY 8 HOURS
Qty: 90 TABLET | Refills: 0 | Status: SHIPPED | OUTPATIENT
Start: 2019-09-10

## 2019-09-10 RX ORDER — OXYCODONE HYDROCHLORIDE 5 MG/1
5-10 TABLET ORAL
Qty: 20 TABLET | Refills: 0 | Status: SHIPPED | OUTPATIENT
Start: 2019-09-10

## 2019-09-10 RX ORDER — ACETAMINOPHEN 325 MG/1
650 TABLET ORAL EVERY 4 HOURS PRN
COMMUNITY
Start: 2019-09-10

## 2019-09-10 RX ADMIN — OXYCODONE HYDROCHLORIDE 5 MG: 5 TABLET ORAL at 08:56

## 2019-09-10 RX ADMIN — IBUPROFEN 600 MG: 600 TABLET ORAL at 02:08

## 2019-09-10 RX ADMIN — OXYCODONE HYDROCHLORIDE 5 MG: 5 TABLET ORAL at 05:30

## 2019-09-10 RX ADMIN — ACETAMINOPHEN 650 MG: 325 TABLET, FILM COATED ORAL at 08:57

## 2019-09-10 RX ADMIN — LABETALOL HYDROCHLORIDE 200 MG: 100 TABLET, FILM COATED ORAL at 12:09

## 2019-09-10 RX ADMIN — LEVOTHYROXINE SODIUM 75 MCG: 75 TABLET ORAL at 06:09

## 2019-09-10 RX ADMIN — LABETALOL HYDROCHLORIDE 200 MG: 100 TABLET, FILM COATED ORAL at 08:56

## 2019-09-10 RX ADMIN — SODIUM PHOSPHATE, DIBASIC AND SODIUM PHOSPHATE, MONOBASIC 1 ENEMA: 7; 19 ENEMA RECTAL at 13:30

## 2019-09-10 RX ADMIN — SENNOSIDES AND DOCUSATE SODIUM 2 TABLET: 8.6; 5 TABLET ORAL at 08:57

## 2019-09-10 RX ADMIN — ACETAMINOPHEN 650 MG: 325 TABLET, FILM COATED ORAL at 14:38

## 2019-09-10 RX ADMIN — Medication 10 MG: at 12:10

## 2019-09-10 RX ADMIN — IBUPROFEN 600 MG: 600 TABLET ORAL at 14:38

## 2019-09-10 RX ADMIN — OXYCODONE HYDROCHLORIDE 5 MG: 5 TABLET ORAL at 02:08

## 2019-09-10 RX ADMIN — IBUPROFEN 600 MG: 600 TABLET ORAL at 08:57

## 2019-09-10 RX ADMIN — OXYCODONE HYDROCHLORIDE 5 MG: 5 TABLET ORAL at 12:09

## 2019-09-10 RX ADMIN — OXYCODONE HYDROCHLORIDE 5 MG: 5 TABLET ORAL at 15:48

## 2019-09-10 RX ADMIN — ACETAMINOPHEN 650 MG: 325 TABLET, FILM COATED ORAL at 02:08

## 2019-09-10 NOTE — PLAN OF CARE
Updated the vital signs at 1435 to Dr. Schwab: /81, temp 97.8, and pulse 77.  Pt ok to discharge to home and follow up in clinic next week.  Dr. Schwab will put discharge orders in.  Continues to monitor Pt.

## 2019-09-10 NOTE — PLAN OF CARE
Bp 147/88 & 134/88.Continue on Labetelol 200 mg oral twice daily.Ibuprofen, Tylenol & Oxycodone providing adequate pain control. Declined abdominal binder.Had small bm, requested laxative. [place] increased to 2 tabs& MOM given. Also encouraged to walk in [place]. Reg diet. Voiding. Bottle feeding Donor milk & Pumping for ebm and got 5 ml which was fed to baby.

## 2019-09-10 NOTE — PLAN OF CARE
"Pain is under control with tylenol, ibuprofen, and oxycodone. Incision C/D/I.  Up to bathroom independently. Encouraged Pt to walk in the hallway.  Pt has been walking in room, tolerated well.  Pt denied feeling any headache, nausea, blurry vision, and/or epigastric pain. After [place] enema given Pt had a BM and stated that \"I felt much better.\" Encouraged Pt to pump every 3 hours. Pt's milk is coming in.  Pt is taking a shower right at this time.  Will check Pt's BP after her shower. Continue to monitor and notify MD as needed.   "

## 2019-09-10 NOTE — LACTATION NOTE
Attempted to visit and Kaity asleep at 0805.  Will follow as needed. Nancy Amaya BSN, RN, PHN, RNC-MNN, IBCLC

## 2019-09-10 NOTE — PROGRESS NOTES
"Feels well, pain controlled    /85   Pulse 76   Temp 98  F (36.7  C) (Oral)   Resp 16   Ht 1.626 m (5' 4\")   Wt 79.4 kg (175 lb)   SpO2 96%   Breastfeeding? Unknown   BMI 30.04 kg/m      -145/85-90    A/P: POD 4 s/p primary CS, GHTN  Labetalol 200 mg BID started yesterday; BP remains mildly elevated. Will increase to TID with additional dose given at noon today. She has her own BP cuff here, will compare with hospital reading for accuracy. Plan for tentative discharge home late afternoon with home BP monitoring and f/u within 1 week.    Jennifer L. Schwab, MD        "

## 2019-09-10 NOTE — DISCHARGE INSTRUCTIONS
Postop  Birth Instructions    Activity       Do not lift more than 10 pounds for 6 weeks after surgery.  Ask family and friends for help when you need it.    No driving until you have stopped taking your pain medications (usually two weeks after surgery).    No heavy exercise or activity for 6 weeks.  Don't do anything that will put a strain on your surgery site.    Don't strain when using the toilet.  Your care team may prescribe a stool softener if you have problems with your bowel movements.     To care for your incision:       Keep the incision clean and dry.    Do not soak your incision in water. No swimming or hot tubs until it has fully healed. You may soak in the bathtub if the water level is below your incision.    Do not use peroxide, gel, cream, lotion, or ointment on your incision.    Adjust your clothes to avoid pressure on your surgery site (check the elastic in your underwear for example).     You may see a small amount of clear or pink drainage and this is normal.  Check with your health care provider:       If the drainage increases or has an odor.    If the incision reddens, you have swelling, or develop a rash.    If you have increased pain and the medicine we prescribed doesn't help.    If you have a fever above 100.4 F (38 C) with or without chills when placing thermometer under your tongue.   The area around your incision (surgery wound), will feel numb.  This is normal. The numbness should go away in less than a year.     Keep your hands clean:  Always wash your hands before touching your incision (surgery wound). This helps reduce your risk of infection. If your hands aren't dirty, you may use an alcohol hand-rub to clean your hands. Keep your nails clean and short.    Call your healthcare provider if you have any of these symptoms:       You soak a sanitary pad with blood within 1 hour, or you see blood clots larger than a golf ball.    Bleeding that lasts more than 6  weeks.    Vaginal discharge that smells bad.    Severe pain, cramping or tenderness in your lower belly area.    A need to urinate more frequently (use the toilet more often), more urgently (use the toilet very quickly), or it burns when you urinate.    Nausea and vomiting.    Redness, swelling or pain around a vein in your leg.    Problems breastfeeding or a red or painful area on your breast.    Chest pain and cough or are gasping for air.    Problems with coping with sadness, anxiety or depression. If you have concerns about hurting yourself or the baby, call your provider immediately.      You have questions or concerns after you return home.        Preeclampsia   Call your doctor right away if you have any of the following:  - Edema (swelling) in your face or hands  - Rapid weight gain-about 1 pound or more in a day  - Headache  - Abdominal pain on your right side  - Vision problems (flashes or spots)  - You have questions or concerns once you return home.

## 2019-09-10 NOTE — PLAN OF CARE
"Notified Dr. Schwab that Pt c/o feeling \"uncomfortable.\"  Pt had a tiny bowel movement this morning and still feeling uncomfortable.  Suppository given and Pt didn't have any good result. Pt requested to take more laxative.  Dr. Schwab stated that \"it takes time and have Pt walk around and then try again.\"  Continues to monitor Pt.  "

## 2019-09-10 NOTE — PLAN OF CARE
VSS. Pain controlled by tylenol, ibuprofen, and oxycodone. Denies pre-eclampsia symptoms. Discharge instructions reviewed with pt and . Questions answered. Sent home with oxycodone and labetalol prescriptions, new order to take labetalol TID instead of BID, pt understands. Will follow up per orders.

## 2019-09-16 NOTE — DISCHARGE SUMMARY
[facility] Discharge Summary    Kaity Seay MRN# [id number]   Age: 39 year old YOB: 1980     Date of Admission:  2019  Date of Discharge::  9/10/2019  6:15 PM  Admitting Physician:  Issac Farr MD  Discharge Physician:  Issac Farr MD     Home clinic: [facility]          Admission Diagnoses:   Intrauterine pregnancy at 37 weeks gestation  Gestational Hypertension  Desires primary  Section          Discharge Diagnosis:   POD #4 status post primary  Section  Postpartum Hypertension          Procedures:   Procedure(s): Primary low transverse  section       No other procedures performed during this admission           Medications Prior to Admission:     No medications prior to admission.             Discharge Medications:     Discharge Medication List as of 9/10/2019  3:35 PM      START taking these medications    Details   acetaminophen (TYLENOL) 325 MG tablet Take 2 tablets (650 mg) by mouth every 4 hours as needed for other (multimodal surgical pain management along with NSAIDS and opioid medication as indicated based on pain control and physical function.), OTC      ibuprofen (ADVIL/MOTRIN) 600 MG tablet Take 1 tablet (600 mg) by mouth every 6 hours as needed for other (cramping), OTC      labetalol (NORMODYNE) 200 MG tablet Take 1 tablet (200 mg) by mouth every 8 hours, Disp-90 tablet, R-0, Local Print      lanolin ointment Apply topically every hour as needed for dry skin (soreness)OTC      oxyCODONE (ROXICODONE) 5 MG tablet Take 1-2 tablets (5-10 mg) by mouth every 3 hours as needed for moderate to severe pain, Disp-20 tablet, R-0, Local Print         CONTINUE these medications which have NOT CHANGED    Details   levothyroxine (SYNTHROID/LEVOTHROID) 75 MCG tablet Take 75 mcg by mouth daily, Historical      Prenatal Vit-Fe Fumarate-FA (PRENATAL MULTIVITAMIN W/IRON) 27-0.8 MG tablet Take 1 tablet by mouth daily, Historical                    Consultations:   No consultations were requested during this admission          Brief History of Labor or Admission:   Admitted for elective primary  Section at 37W 0D gestation secondary to the development of gestational hypertension. Surgery uncomplicated. See H&P and Operative Report for details.           Hospital Course:   The patient's hospital course was unremarkable.  She recovered as anticipated and experienced no post-operative complications. On discharge, her pain was well controlled. Vaginal bleeding is similar to peak menstrual flow.  Voiding without difficulty.  Ambulating well and tolerating a normal diet.  No fever or significant wound drainage.  Breastfeeding well.  Infant is stable.  No bowel movement yet.  Elevated blood pressures continued post-op. She was started on oral Labetalol post-op and was discharged home on Labetalol 200 mg PO TID. She was discharged on post-partum day #4.    Post-partum hemoglobin:   Hemoglobin   Date Value Ref Range Status   2019 11.7 11.7 - 15.7 g/dL Final             Discharge Instructions and Follow-Up:   Discharge diet: Regular   Discharge activity: Lifting restricted to 20 pounds  No lifting, driving, or strenuous exercise for 6 week(s)  No driving or operating machinery while on narcotic analgesics  Pelvic rest: abstain from intercourse and do not use tampons for 6 week(s)   Discharge follow-up: Follow up within 7 days at [facility] for a blood pressure check and with Dr. Lin in 6 weeks   Wound care: Drink plenty of fluids  Ice to area for comfort  Keep wound clean and dry  Steri-strips off in 7 days           Discharge Disposition:   Discharged to home      Attestation:  Total time: 30 minutes    Issac Farr MD

## 2021-06-01 ENCOUNTER — OFFICE VISIT (OUTPATIENT)
Dept: URGENT CARE | Facility: URGENT CARE | Age: 41
End: 2021-06-01
Payer: COMMERCIAL

## 2021-06-01 VITALS
DIASTOLIC BLOOD PRESSURE: 73 MMHG | BODY MASS INDEX: 25.75 KG/M2 | SYSTOLIC BLOOD PRESSURE: 129 MMHG | OXYGEN SATURATION: 95 % | WEIGHT: 150 LBS | HEART RATE: 75 BPM | TEMPERATURE: 96.2 F

## 2021-06-01 DIAGNOSIS — S91.114A LACERATION OF FIFTH TOE OF RIGHT FOOT, INITIAL ENCOUNTER: Primary | ICD-10-CM

## 2021-06-01 PROCEDURE — 99207 PR NO CHARGE LOS: CPT | Performed by: NURSE PRACTITIONER

## 2021-06-01 PROCEDURE — 12001 RPR S/N/AX/GEN/TRNK 2.5CM/<: CPT | Performed by: NURSE PRACTITIONER

## 2021-06-01 NOTE — PROGRESS NOTES
Assessment & Plan     Laceration of fifth toe of right foot, initial encounter  Derma villafuerte used for repair after area was cleaned with Hibiclens.  Tdap in 2019  Declined xray of toe today.       20 minutes spent on the date of the encounter doing patient visit and documentation        See Patient Instructions    Return in about 1 week (around 6/8/2021), or if symptoms worsen or fail to improve.    Adele Cowan, CNP  [facility] LIYA Briceno is a 41 year old who presents for the following health issues     HPI       Concern - laceration to right 5th toe  Onset: 2 hours ago  Description: shovel dropped onto right 5th toe. Was wearing flip flops  Intensity: bled a lot some bruising but can move toes freely.   Progression of Symptoms:  Same  Last Tdap in 2019        Review of Systems   Constitutional, HEENT, cardiovascular, pulmonary, gi and gu systems are negative, except as otherwise noted.      Objective    /73   Pulse 75   Temp 96.2  F (35.7  C) (Tympanic)   Wt 68 kg (150 lb)   SpO2 95%   BMI 25.75 kg/m    Body mass index is 25.75 kg/m .  Physical Exam   GENERAL: healthy, alert and no distress  MS: rught 5th toe with V shaped laceration. No bleeding. Mild bruising starting at base  SKIN: as above

## 2021-08-15 ENCOUNTER — HEALTH MAINTENANCE LETTER (OUTPATIENT)
Age: 41
End: 2021-08-15

## 2021-10-10 ENCOUNTER — HEALTH MAINTENANCE LETTER (OUTPATIENT)
Age: 41
End: 2021-10-10

## 2022-09-18 ENCOUNTER — HEALTH MAINTENANCE LETTER (OUTPATIENT)
Age: 42
End: 2022-09-18

## 2023-05-07 ENCOUNTER — HEALTH MAINTENANCE LETTER (OUTPATIENT)
Age: 43
End: 2023-05-07

## 2023-10-08 ENCOUNTER — HEALTH MAINTENANCE LETTER (OUTPATIENT)
Age: 43
End: 2023-10-08

## 2023-11-28 PROCEDURE — 88304 TISSUE EXAM BY PATHOLOGIST: CPT | Mod: TC,ORL | Performed by: COLON & RECTAL SURGERY

## 2023-11-29 ENCOUNTER — LAB REQUISITION (OUTPATIENT)
Dept: LAB | Facility: CLINIC | Age: 43
End: 2023-11-29
Payer: COMMERCIAL

## 2023-11-29 DIAGNOSIS — K64.8 OTHER HEMORRHOIDS: ICD-10-CM

## 2023-11-30 LAB
PATH REPORT.COMMENTS IMP SPEC: NORMAL
PATH REPORT.COMMENTS IMP SPEC: NORMAL
PATH REPORT.FINAL DX SPEC: NORMAL
PATH REPORT.GROSS SPEC: NORMAL
PATH REPORT.MICROSCOPIC SPEC OTHER STN: NORMAL
PATH REPORT.RELEVANT HX SPEC: NORMAL
PHOTO IMAGE: NORMAL

## 2023-11-30 PROCEDURE — 88304 TISSUE EXAM BY PATHOLOGIST: CPT | Mod: 26 | Performed by: PATHOLOGY

## 2024-02-25 ENCOUNTER — HEALTH MAINTENANCE LETTER (OUTPATIENT)
Age: 44
End: 2024-02-25

## (undated) DEVICE — SU CHROMIC 2-0 SH 27" G123H

## (undated) DEVICE — SOL NACL 0.9% IRRIG 1000ML BOTTLE 07138-09

## (undated) DEVICE — PREP CHLORAPREP 26ML TINTED ORANGE  260815

## (undated) DEVICE — ESU GROUND PAD UNIVERSAL W/O CORD

## (undated) DEVICE — SU CHROMIC 3-0 SH 27" G122H

## (undated) DEVICE — GLOVE PROTEXIS BLUE W/NEU-THERA 7.5  2D73EB75

## (undated) DEVICE — PACK C-SECTION LF PL15OTA83B

## (undated) DEVICE — SU VICRYL 2-0 CT-1 27" J339H

## (undated) DEVICE — LINEN C-SECTION 5415

## (undated) DEVICE — SU VICRYL 0 CT-1 27" J340H

## (undated) DEVICE — DRSG STERI STRIP 1/2X4" R1547

## (undated) DEVICE — CATH TRAY FOLEY 16FR BARDEX W/DRAIN BAG STATLOCK 300316A

## (undated) DEVICE — SU PLAIN 3-0 CT 27" 852H

## (undated) DEVICE — SU CHROMIC 1 CT 27" 803H

## (undated) DEVICE — SUCTION CANISTER MEDIVAC LINER 3000ML W/LID 65651-530

## (undated) DEVICE — BLADE CLIPPER 4406